# Patient Record
Sex: MALE | Race: BLACK OR AFRICAN AMERICAN | NOT HISPANIC OR LATINO | URBAN - METROPOLITAN AREA
[De-identification: names, ages, dates, MRNs, and addresses within clinical notes are randomized per-mention and may not be internally consistent; named-entity substitution may affect disease eponyms.]

---

## 2020-04-03 ENCOUNTER — EMERGENCY (EMERGENCY)
Facility: HOSPITAL | Age: 30
LOS: 1 days | Discharge: HOME | End: 2020-04-03
Attending: EMERGENCY MEDICINE | Admitting: EMERGENCY MEDICINE
Payer: COMMERCIAL

## 2020-04-03 VITALS
OXYGEN SATURATION: 100 % | DIASTOLIC BLOOD PRESSURE: 109 MMHG | HEART RATE: 75 BPM | RESPIRATION RATE: 18 BRPM | HEIGHT: 69 IN | WEIGHT: 162.92 LBS | TEMPERATURE: 98 F | SYSTOLIC BLOOD PRESSURE: 162 MMHG

## 2020-04-03 VITALS
OXYGEN SATURATION: 100 % | SYSTOLIC BLOOD PRESSURE: 130 MMHG | HEART RATE: 78 BPM | DIASTOLIC BLOOD PRESSURE: 81 MMHG | TEMPERATURE: 98 F | RESPIRATION RATE: 15 BRPM

## 2020-04-03 DIAGNOSIS — R07.89 OTHER CHEST PAIN: ICD-10-CM

## 2020-04-03 PROCEDURE — 93010 ELECTROCARDIOGRAM REPORT: CPT

## 2020-04-03 PROCEDURE — 99284 EMERGENCY DEPT VISIT MOD MDM: CPT

## 2020-04-03 PROCEDURE — 71045 X-RAY EXAM CHEST 1 VIEW: CPT | Mod: 26

## 2020-04-03 PROCEDURE — 93005 ELECTROCARDIOGRAM TRACING: CPT

## 2020-04-03 PROCEDURE — 71045 X-RAY EXAM CHEST 1 VIEW: CPT

## 2020-04-03 PROCEDURE — 99283 EMERGENCY DEPT VISIT LOW MDM: CPT | Mod: 25

## 2020-04-03 NOTE — ED PROVIDER NOTE - CARDIAC, MLM
Normal rate, regular rhythm.  Heart sounds S1, S2.  No murmurs, rubs or gallops. nontender chest wall.no lesions. 2+ rad pulses

## 2020-04-03 NOTE — ED PROVIDER NOTE - PATIENT PORTAL LINK FT
You can access the FollowMyHealth Patient Portal offered by [facility] by registering at the following website: [email or web address]. By joining [facility]’s FollowMyHealth portal, you will also be able to view your health information using other applications (apps) compatible with our system.

## 2020-04-03 NOTE — ED PROVIDER NOTE - OBJECTIVE STATEMENT
pt c/o mid chest pain, sharp, mild, today, assoc c slight cough for 3 days which he attributes to his h/o seasonal allergy.  no rhinorrhea, sore throat, sob. no leg pain/swelling /recent periods of immobility. no fever/chills. no illegal drug use

## 2020-04-03 NOTE — ED ADULT NURSE NOTE - NSIMPLEMENTINTERV_GEN_ALL_ED
Implemented All Universal Safety Interventions:  [place] to call system. Call bell, personal items and telephone within reach. Instruct patient to call for assistance. Room bathroom lighting operational. Non-slip footwear when patient is off stretcher. Physically safe environment: no spills, clutter or unnecessary equipment. Stretcher in lowest position, wheels locked, appropriate side rails in place.

## 2020-04-03 NOTE — ED PROVIDER NOTE - NSFOLLOWUPINSTRUCTIONS_ED_ALL_ED_FT
Follow Up in 1-3 Days with your own doctor or with  [address] [address]  Phone: [phone]      Chest Pain    WHAT YOU NEED TO KNOW:    Chest pain can be caused by a range of conditions, from not serious to life-threatening. Chest pain can be a symptom of a digestive problem, such as acid reflux or a stomach ulcer. An anxiety attack or a strong emotion, such as anger, can also cause chest pain. Infection, inflammation, or a fracture in the bones or cartilage in your chest can cause pain or discomfort. Sometimes chest pain or pressure is caused by poor blood flow to your heart (angina). Chest pain may also be caused by life-threatening conditions such as a heart attack or blood clot in your lungs.     DISCHARGE INSTRUCTIONS:    Call 911 if:   You have any of the following signs of a heart attack:   Squeezing, pressure, or pain in your chest      You may also have any of the following:   Discomfort or pain in your back, neck, jaw, stomach, or arm    Shortness of breath    Nausea or vomiting    Lightheadedness or a sudden cold sweat    Return to the emergency department if:     You have chest discomfort that gets worse, even with medicine.    You cough or vomit blood.     Your bowel movements are black or bloody.     You cannot stop vomiting, or it hurts to swallow.     Contact your healthcare provider if:     You have questions or concerns about your condition or care.        Medicines:     Medicines may be given to treat the cause of your chest pain. Examples include pain medicine, anxiety medicine, or medicines to increase blood flow to your heart.       Do not take certain medicines without asking your healthcare provider first. These include NSAIDs, herbal or vitamin supplements, or hormones (estrogen or progestin).       Take your medicine as directed. Contact your healthcare provider if you think your medicine is not helping or if you have side effects. Tell him or her if you are allergic to any medicine. Keep a list of the medicines, vitamins, and herbs you take. Include the amounts, and when and why you take them. Bring the list or the pill bottles to follow-up visits. Carry your medicine list with you in case of an emergency.    Follow up with your healthcare provider within 72 hours, or as directed: You may need to return for more tests to find the cause of your chest pain. You may be referred to a specialist, such as a cardiologist or gastroenterologist. Write down your questions so you remember to ask them during your visits.     Healthy living tips: The following are general healthy guidelines. If your chest pain is caused by a heart problem, your healthcare provider will give you specific guidelines to follow.    Do not smoke. Nicotine and other chemicals in cigarettes and cigars can cause lung and heart damage. Ask your healthcare provider for information if you currently smoke and need help to quit. E-cigarettes or smokeless tobacco still contain nicotine. Talk to your healthcare provider before you use these products.       Eat a variety of healthy, low-fat, low-salt foods. Healthy foods include fruits, vegetables, whole-grain breads, low-fat dairy products, beans, lean meats, and fish. Ask for more information about a heart healthy diet.    Drink plenty of water every day. Your body is made of mostly water. Water helps your body to control your temperature and blood pressure. Ask your healthcare provider how much water you should drink every day.    Ask about activity. Your healthcare provider will tell you which activities to limit or avoid. Ask when you can drive, return to work, and have sex. Ask about the best exercise plan for you.    Maintain a healthy weight. Ask your healthcare provider how much you should weigh. Ask him or her to help you create a weight loss plan if you are overweight.     Get the flu and pneumonia vaccines. All adults should get the influenza (flu) vaccine. Get it every year as soon as it becomes available. The pneumococcal vaccine is given to adults aged 65 years or older. The vaccine is given every 5 years to prevent pneumococcal disease, such as pneumonia.    If you have a stent:   Carry your stent card with you at all times.   Let all healthcare providers know that you have a stent.

## 2020-04-03 NOTE — ED PROVIDER NOTE - CLINICAL SUMMARY MEDICAL DECISION MAKING FREE TEXT BOX
nonspecific chest pain today in healthy young pt. ekg, cxr done, unremarkable. no signs of acs, pna. no RF for pe.   BP initially high, improved. pt well appearing, stable for dc.  ?gerd, allergies, mild viral syndrome.  pmd fu

## 2020-04-07 PROBLEM — Z78.9 OTHER SPECIFIED HEALTH STATUS: Chronic | Status: ACTIVE | Noted: 2020-04-04

## 2020-04-07 PROBLEM — Z00.00 ENCOUNTER FOR PREVENTIVE HEALTH EXAMINATION: Status: ACTIVE | Noted: 2020-04-07

## 2020-04-08 ENCOUNTER — OUTPATIENT (OUTPATIENT)
Dept: OUTPATIENT SERVICES | Facility: HOSPITAL | Age: 30
LOS: 1 days | End: 2020-04-08
Payer: SELF-PAY

## 2020-04-08 ENCOUNTER — APPOINTMENT (OUTPATIENT)
Dept: FAMILY MEDICINE | Facility: HOSPITAL | Age: 30
End: 2020-04-08

## 2020-04-08 VITALS
SYSTOLIC BLOOD PRESSURE: 154 MMHG | WEIGHT: 156 LBS | DIASTOLIC BLOOD PRESSURE: 96 MMHG | TEMPERATURE: 97.6 F | HEIGHT: 68 IN | OXYGEN SATURATION: 98 % | HEART RATE: 60 BPM | BODY MASS INDEX: 23.64 KG/M2 | RESPIRATION RATE: 14 BRPM

## 2020-04-08 DIAGNOSIS — Z78.9 OTHER SPECIFIED HEALTH STATUS: ICD-10-CM

## 2020-04-08 DIAGNOSIS — Z00.00 ENCOUNTER FOR GENERAL ADULT MEDICAL EXAMINATION WITHOUT ABNORMAL FINDINGS: ICD-10-CM

## 2020-04-08 DIAGNOSIS — F17.200 NICOTINE DEPENDENCE, UNSPECIFIED, UNCOMPLICATED: ICD-10-CM

## 2020-04-08 DIAGNOSIS — Z82.49 FAMILY HISTORY OF ISCHEMIC HEART DISEASE AND OTHER DISEASES OF THE CIRCULATORY SYSTEM: ICD-10-CM

## 2020-04-08 PROCEDURE — G0463: CPT

## 2020-04-08 RX ORDER — OMEPRAZOLE 40 MG/1
40 CAPSULE, DELAYED RELEASE ORAL DAILY
Qty: 30 | Refills: 0 | Status: DISCONTINUED | OUTPATIENT
Start: 2020-04-08 | End: 2020-04-08

## 2020-04-08 NOTE — HISTORY OF PRESENT ILLNESS
[Post-hospitalization from ___ Hospital] : Post-hospitalization from [unfilled] Hospital [Admitted on: ___] : The patient was admitted on [unfilled] [Discharged on ___] : discharged on [unfilled] [Discharge Summary] : discharge summary [Pertinent Labs] : pertinent labs [Radiology Findings] : radiology findings [Discharge Med List] : discharge medication list [Other: ____] : [unfilled] [Med Reconciliation] : medication reconciliation has been completed [FreeTextEntry2] : Patient is a healthy 30 y/o M who present's to the clinic after being discharged from the ED on 4/4/20 for chest pain. Patient recalls that he began having dull, 5/10, substernal, radiated to the left subcostal region, nonpositional pain friday night for one hour before admitting himself to the ED. Patient reports that his pain was associated with palpitations after administering 2 pumps of his rescue inhaler. Patient recollects that he had similar episodes for the past two weeks with fast food 45 minutes after eating. Patient reports that he has never had this chest pain with exertion. He endorses a acidic taste in his mouth from time to time in the mornings.

## 2020-04-08 NOTE — END OF VISIT
[] : Resident [FreeTextEntry3] : I doubt that pt's chest pain is of cardiac origine the way that he described for resident that occur after having fast food and it is regurgitating to her throat, we treat for GERD and if continue to have the same symptoms will need cardiology referral. needs treatment for HTN, will start on amlodipine

## 2020-04-08 NOTE — REVIEW OF SYSTEMS
[Chest Pain] : chest pain [Negative] : Heme/Lymph [Palpitations] : no palpitations [Claudication] : no  leg claudication [Orthopena] : no orthopnea [Shortness Of Breath] : no shortness of breath [Wheezing] : no wheezing [Cough] : no cough [FreeTextEntry5] : 2/10 currently

## 2020-04-08 NOTE — ASSESSMENT
[FreeTextEntry1] : \par \par # Chest Pain secondary to GERD\par -PPI trial of 4 weeks. Patient was told to start a diary for chest pain triggers and avoid fatty foods\par -Cardiology Consult Heart score 0 in the ED. Possible stress test\par \par #HTN\par -Patient elevated BP in ED and clinic 150/90\par -Patient wants to try lifestyle modifications( DASH diet)\par -Called patient and told him to start Norvasc 5mg QD and that he can pick it up in pharmacy \par -Ordered CBC, CMP, and Lipid Panel\par \par Return as needed or in 3 months \par

## 2020-04-10 DIAGNOSIS — R07.9 CHEST PAIN, UNSPECIFIED: ICD-10-CM

## 2020-04-10 DIAGNOSIS — K21.9 GASTRO-ESOPHAGEAL REFLUX DISEASE WITHOUT ESOPHAGITIS: ICD-10-CM

## 2020-04-10 DIAGNOSIS — I10 ESSENTIAL (PRIMARY) HYPERTENSION: ICD-10-CM

## 2020-05-05 ENCOUNTER — TRANSCRIPTION ENCOUNTER (OUTPATIENT)
Age: 30
End: 2020-05-05

## 2021-12-07 ENCOUNTER — TRANSCRIPTION ENCOUNTER (OUTPATIENT)
Age: 31
End: 2021-12-07

## 2022-04-20 ENCOUNTER — TRANSCRIPTION ENCOUNTER (OUTPATIENT)
Age: 32
End: 2022-04-20

## 2022-04-23 ENCOUNTER — TRANSCRIPTION ENCOUNTER (OUTPATIENT)
Age: 32
End: 2022-04-23

## 2022-04-24 ENCOUNTER — EMERGENCY (EMERGENCY)
Facility: HOSPITAL | Age: 32
LOS: 1 days | Discharge: HOME | End: 2022-04-24
Attending: EMERGENCY MEDICINE | Admitting: EMERGENCY MEDICINE
Payer: SELF-PAY

## 2022-04-24 ENCOUNTER — TRANSCRIPTION ENCOUNTER (OUTPATIENT)
Age: 32
End: 2022-04-24

## 2022-04-24 VITALS
TEMPERATURE: 98 F | RESPIRATION RATE: 18 BRPM | SYSTOLIC BLOOD PRESSURE: 155 MMHG | DIASTOLIC BLOOD PRESSURE: 99 MMHG | HEIGHT: 69 IN | WEIGHT: 179.9 LBS | OXYGEN SATURATION: 99 % | HEART RATE: 62 BPM

## 2022-04-24 VITALS
HEART RATE: 55 BPM | OXYGEN SATURATION: 99 % | DIASTOLIC BLOOD PRESSURE: 93 MMHG | RESPIRATION RATE: 17 BRPM | SYSTOLIC BLOOD PRESSURE: 151 MMHG | TEMPERATURE: 98 F

## 2022-04-24 LAB
ALBUMIN SERPL ELPH-MCNC: 3.8 G/DL — SIGNIFICANT CHANGE UP (ref 3.3–5)
ALP SERPL-CCNC: 69 U/L — SIGNIFICANT CHANGE UP (ref 40–120)
ALT FLD-CCNC: 271 U/L — HIGH (ref 10–45)
ANION GAP SERPL CALC-SCNC: 5 MMOL/L — SIGNIFICANT CHANGE UP (ref 5–17)
APPEARANCE UR: CLEAR — SIGNIFICANT CHANGE UP
AST SERPL-CCNC: 1101 U/L — HIGH (ref 10–40)
BACTERIA # UR AUTO: NEGATIVE /HPF — SIGNIFICANT CHANGE UP
BASOPHILS # BLD AUTO: 0.02 K/UL — SIGNIFICANT CHANGE UP (ref 0–0.2)
BASOPHILS NFR BLD AUTO: 0.3 % — SIGNIFICANT CHANGE UP (ref 0–2)
BILIRUB SERPL-MCNC: 0.4 MG/DL — SIGNIFICANT CHANGE UP (ref 0.2–1.2)
BILIRUB UR-MCNC: NEGATIVE — SIGNIFICANT CHANGE UP
BUN SERPL-MCNC: 10 MG/DL — SIGNIFICANT CHANGE UP (ref 7–23)
CALCIUM SERPL-MCNC: 9 MG/DL — SIGNIFICANT CHANGE UP (ref 8.4–10.5)
CHLORIDE SERPL-SCNC: 103 MMOL/L — SIGNIFICANT CHANGE UP (ref 96–108)
CK SERPL-CCNC: 5766 U/L — HIGH (ref 30–200)
CO2 SERPL-SCNC: 32 MMOL/L — HIGH (ref 22–31)
COLOR SPEC: YELLOW — SIGNIFICANT CHANGE UP
CREAT SERPL-MCNC: 1.12 MG/DL — SIGNIFICANT CHANGE UP (ref 0.5–1.3)
DIFF PNL FLD: ABNORMAL
EGFR: 91 ML/MIN/1.73M2 — SIGNIFICANT CHANGE UP
EOSINOPHIL # BLD AUTO: 0.19 K/UL — SIGNIFICANT CHANGE UP (ref 0–0.5)
EOSINOPHIL NFR BLD AUTO: 3.2 % — SIGNIFICANT CHANGE UP (ref 0–6)
EPI CELLS # UR: SIGNIFICANT CHANGE UP
GLUCOSE SERPL-MCNC: 140 MG/DL — HIGH (ref 70–99)
GLUCOSE UR QL: NEGATIVE — SIGNIFICANT CHANGE UP
HCT VFR BLD CALC: 40.7 % — SIGNIFICANT CHANGE UP (ref 39–50)
HGB BLD-MCNC: 13.2 G/DL — SIGNIFICANT CHANGE UP (ref 13–17)
IMM GRANULOCYTES NFR BLD AUTO: 0.3 % — SIGNIFICANT CHANGE UP (ref 0–1.5)
KETONES UR-MCNC: ABNORMAL
LEUKOCYTE ESTERASE UR-ACNC: NEGATIVE — SIGNIFICANT CHANGE UP
LYMPHOCYTES # BLD AUTO: 1.97 K/UL — SIGNIFICANT CHANGE UP (ref 1–3.3)
LYMPHOCYTES # BLD AUTO: 32.9 % — SIGNIFICANT CHANGE UP (ref 13–44)
MCHC RBC-ENTMCNC: 27.3 PG — SIGNIFICANT CHANGE UP (ref 27–34)
MCHC RBC-ENTMCNC: 32.4 GM/DL — SIGNIFICANT CHANGE UP (ref 32–36)
MCV RBC AUTO: 84.1 FL — SIGNIFICANT CHANGE UP (ref 80–100)
MONOCYTES # BLD AUTO: 0.46 K/UL — SIGNIFICANT CHANGE UP (ref 0–0.9)
MONOCYTES NFR BLD AUTO: 7.7 % — SIGNIFICANT CHANGE UP (ref 2–14)
NEUTROPHILS # BLD AUTO: 3.32 K/UL — SIGNIFICANT CHANGE UP (ref 1.8–7.4)
NEUTROPHILS NFR BLD AUTO: 55.6 % — SIGNIFICANT CHANGE UP (ref 43–77)
NITRITE UR-MCNC: NEGATIVE — SIGNIFICANT CHANGE UP
NRBC # BLD: 0 /100 WBCS — SIGNIFICANT CHANGE UP (ref 0–0)
PH UR: 6 — SIGNIFICANT CHANGE UP (ref 5–8)
PLATELET # BLD AUTO: 202 K/UL — SIGNIFICANT CHANGE UP (ref 150–400)
POTASSIUM SERPL-MCNC: 3 MMOL/L — LOW (ref 3.5–5.3)
POTASSIUM SERPL-SCNC: 3 MMOL/L — LOW (ref 3.5–5.3)
PROT SERPL-MCNC: 8 G/DL — SIGNIFICANT CHANGE UP (ref 6–8.3)
PROT UR-MCNC: 15
RBC # BLD: 4.84 M/UL — SIGNIFICANT CHANGE UP (ref 4.2–5.8)
RBC # FLD: 13.5 % — SIGNIFICANT CHANGE UP (ref 10.3–14.5)
RBC CASTS # UR COMP ASSIST: SIGNIFICANT CHANGE UP /HPF (ref 0–4)
SODIUM SERPL-SCNC: 140 MMOL/L — SIGNIFICANT CHANGE UP (ref 135–145)
SP GR SPEC: 1.01 — SIGNIFICANT CHANGE UP (ref 1.01–1.02)
UROBILINOGEN FLD QL: NEGATIVE — SIGNIFICANT CHANGE UP
WBC # BLD: 5.98 K/UL — SIGNIFICANT CHANGE UP (ref 3.8–10.5)
WBC # FLD AUTO: 5.98 K/UL — SIGNIFICANT CHANGE UP (ref 3.8–10.5)
WBC UR QL: SIGNIFICANT CHANGE UP /HPF (ref 0–5)

## 2022-04-24 PROCEDURE — 82550 ASSAY OF CK (CPK): CPT

## 2022-04-24 PROCEDURE — 81001 URINALYSIS AUTO W/SCOPE: CPT

## 2022-04-24 PROCEDURE — 99053 MED SERV 10PM-8AM 24 HR FAC: CPT

## 2022-04-24 PROCEDURE — 85025 COMPLETE CBC W/AUTO DIFF WBC: CPT

## 2022-04-24 PROCEDURE — 36415 COLL VENOUS BLD VENIPUNCTURE: CPT

## 2022-04-24 PROCEDURE — 80053 COMPREHEN METABOLIC PANEL: CPT

## 2022-04-24 PROCEDURE — 99284 EMERGENCY DEPT VISIT MOD MDM: CPT

## 2022-04-24 PROCEDURE — 99283 EMERGENCY DEPT VISIT LOW MDM: CPT

## 2022-04-24 RX ORDER — SODIUM CHLORIDE 9 MG/ML
1000 INJECTION INTRAMUSCULAR; INTRAVENOUS; SUBCUTANEOUS ONCE
Refills: 0 | Status: COMPLETED | OUTPATIENT
Start: 2022-04-24 | End: 2022-04-24

## 2022-04-24 RX ADMIN — SODIUM CHLORIDE 1000 MILLILITER(S): 9 INJECTION INTRAMUSCULAR; INTRAVENOUS; SUBCUTANEOUS at 01:22

## 2022-04-24 RX ADMIN — SODIUM CHLORIDE 1000 MILLILITER(S): 9 INJECTION INTRAMUSCULAR; INTRAVENOUS; SUBCUTANEOUS at 01:56

## 2022-04-24 RX ADMIN — SODIUM CHLORIDE 2000 MILLILITER(S): 9 INJECTION INTRAMUSCULAR; INTRAVENOUS; SUBCUTANEOUS at 00:56

## 2022-04-24 RX ADMIN — SODIUM CHLORIDE 1000 MILLILITER(S): 9 INJECTION INTRAMUSCULAR; INTRAVENOUS; SUBCUTANEOUS at 02:20

## 2022-04-24 NOTE — ED PROVIDER NOTE - OBJECTIVE STATEMENT
30 y/o Male with no sig PMHx presents to Ed c/o severe red colored urine for past 2 days, after working out too much in gym, he was seen in urgent care center and was told he has rhabdo, as had lot of blood and protein in urine, he was advised to drink lot  of liquids, Now he denies any bodyache or muscle pain but c/o right lower groin pain, no N/V

## 2022-04-24 NOTE — ED PROVIDER NOTE - NSFOLLOWUPINSTRUCTIONS_ED_ALL_ED_FT
.      Rhabdomyolysis    WHAT YOU NEED TO KNOW:    What is rhabdomyolysis? Rhabdomyolysis is a condition where injured muscles release harmful substances into the bloodstream. These substances include potassium, phosphate, creatinine kinase, and myoglobin. Large amounts of these substances may damage your kidneys and other organs.     What causes rhabdomyolysis?   •Conditions, such as seizures, severe asthma, and infections. Excessive vomiting or diarrhea, diabetes, or problems of hyperthyroidism (thyroid storm) may also injure your muscles.      •Temperature extremes, such as hyperthermia (very high body temperature) or hypothermia (very low body temperature).      •Extreme muscular activity, such as running marathons, can cause muscle stress and injury.      •Medicines or harmful substances, such as antidepressants or cholesterol medicine may injury your muscles. An overdose of aspirin or diuretics may cause an electrolyte imbalance and muscle injury. Alcohol and illegal drugs such as amphetamines, opiates, ecstasy, and LSD can also cause muscle injury.      •Trauma to the muscles, such as a crushing injury, electrical shock, or severe burns, can cause rhabdomyolysis.      What are the signs and symptoms of rhabdomyolysis?   •Pain, swelling, bruising, or weakness in your legs, arms, or lower back      •Dark-colored urine, blood in the urine, or passing little or no urine at all      •Fast heartbeat      •Confusion or easy irritation      •Nausea and vomiting      •Trouble breathing      How is rhabdomyolysis diagnosed?   •Blood and urine tests may show damage to your kidneys and liver. These tests may also show which substances are released by your injured muscles.       •A CT or MRI may show the muscle injuries or changes. You may be given contrast liquid to help the muscles show up better in the pictures. Tell the healthcare provider if you have ever had an allergic reaction to contrast liquid. Do not enter the MRI room with anything metal. Metal can cause serious injury. Tell the healthcare provider if you have any metal in or on your body.      •A biopsy from your muscle may show which substances are being released. This can help healthcare providers plan your treatment.      How is rhabdomyolysis treated?   •Large amounts of IV fluid help flush substances through your kidneys. Medicines may be added to the fluid to help flush out harmful substances and get rid of extra fluid. Medicines may also help reduce the acidity of your urine.      •Dialysis cleans your blood when your kidneys cannot. Extra water, chemicals, and waste products are removed from your blood by a dialyzer or dialysis machine. The dialysis machine does this by passing your blood through a special filter, then returning it back to you.      •A blood transfusion is when you are given whole or parts of blood through an IV. Blood is tested for diseases, such as hepatitis and HIV, to be sure it is safe.       •Fasciotomy is surgery to cut tissues that cover your muscles. This decreases pressure on blood vessels and nerves caused by swelling of the injured muscle.       How can I manage my symptoms?   •Drink liquids as directed. Ask how much liquid to drink each day and which liquids are best for you. Drink more liquids if you are doing strenuous work, exercise, and if it is warm outside. Liquids help flush substances from your body.      •Do not drink alcohol. Heavy alcohol use may increase your risk for rhabdomyolysis.      When should I contact my healthcare provider?   •You have questions or concerns about your condition or care.          When should I seek immediate care or call 911?   •Your urine is dark or tea-colored or has blood in it.      •You have pain, swelling, or weakness in your arms or legs that does not go away or gets worse.      •You are urinating less than usual or not able to urinate.      •You have chest pain.      •Your heart is beating faster than usual or has a strange rhythm.      CARE AGREEMENT:    You have the right to help plan your care. Learn about your health condition and how it may be treated. Discuss treatment options with your healthcare providers to decide what care you want to receive. You always have the right to refuse treatment.        © Copyright [employer] 2022           back to top                          © Copyright [employer] 2022

## 2022-04-24 NOTE — ED PROVIDER NOTE - PATIENT PORTAL LINK FT
You can access the FollowMyHealth Patient Portal offered by [facility] [place] by registering at the following website: [email or web address]. By joining [facility]’s FollowMyHealth portal, you will also be able to view your health information using other applications (apps) compatible with our system.

## 2022-04-24 NOTE — ED ADULT NURSE NOTE - CHIEF COMPLAINT QUOTE
c/o hematuria x4 days. Pt. went to urgent care on Wednesday received urinalaysis which he reports confirms hematuria and +protein found. Pt. states he was "diagnosed with rhabdomyolysis." Pt. went back to follow-up at urgent care today for another urinalaysis and blood work. Results pending. Pt. now c/o right sided flank pain. Pain 5/10.

## 2022-04-24 NOTE — ED PROVIDER NOTE - CLINICAL SUMMARY MEDICAL DECISION MAKING FREE TEXT BOX
pt p/w red color urine after working out 2 days ago, was hydrating himself as he was already seen in an urgent care center , his urine had mod blood and cpk was only 5700, pt was explained this, he was advised to stay hydrated, and avoid exercise for one week

## 2022-08-02 ENCOUNTER — NON-APPOINTMENT (OUTPATIENT)
Age: 32
End: 2022-08-02

## 2022-08-10 ENCOUNTER — NON-APPOINTMENT (OUTPATIENT)
Age: 32
End: 2022-08-10

## 2022-08-10 ENCOUNTER — APPOINTMENT (OUTPATIENT)
Dept: ORTHOPEDIC SURGERY | Facility: CLINIC | Age: 32
End: 2022-08-10

## 2022-08-10 VITALS
HEIGHT: 69 IN | WEIGHT: 175 LBS | DIASTOLIC BLOOD PRESSURE: 78 MMHG | SYSTOLIC BLOOD PRESSURE: 137 MMHG | HEART RATE: 55 BPM | BODY MASS INDEX: 25.92 KG/M2

## 2022-08-10 PROCEDURE — 99203 OFFICE O/P NEW LOW 30 MIN: CPT

## 2022-08-10 PROCEDURE — 73110 X-RAY EXAM OF WRIST: CPT | Mod: LT

## 2022-08-10 NOTE — DISCUSSION/SUMMARY
[FreeTextEntry1] : He has findings consistent with resolving left ulnar-sided wrist pain either secondary to ECU tendinitis or possibly inflammation at the ulnocarpal joint/TFCC ligament.\par \par I had a discussion with the patient regarding today's visit, the prognosis of this diagnosis, and treatment recommendations and options. At this time, he was instructed to wear a wrist brace as needed and to avoid activities such as pushups. He will follow up as needed, according to his symptoms.  If his symptoms persist, then I may further image him in the future with an MRI.\par \par He has agreed to the above plan of management and has expressed full understanding.  All questions were fully answered to their satisfaction. \par \par My cumulative time spent on this visit included: Preparation for the visit, review of the medical records, review of pertinent diagnostic studies, examination and counseling of the patient on the above diagnosis, treatment plan and prognosis, orders of diagnostic tests, medication and/or appropriate procedures and documentation in the medical records of today's visit.

## 2022-08-10 NOTE — ADDENDUM
[FreeTextEntry1] : I, Winnie Prescott, acted solely as a scribe for Dr. Hoyt on this date on 08/10/2022.

## 2022-08-10 NOTE — END OF VISIT
[FreeTextEntry3] : This note was written by Winnie Prescott on 08/10/2022 acting solely as a scribe for Dr. Laureano Hoyt.\par  \par All medical record entries made by the Scribe were at my, Dr. Laureano Hoyt, direction and personally dictated by me on 08/10/2022. I have personally reviewed the chart and agree that the record accurately reflects my personal performance of the history, physical exam, assessment and plan.

## 2022-08-10 NOTE — HISTORY OF PRESENT ILLNESS
[Right] : right hand dominant [FreeTextEntry1] : He comes in today for evaluation of left wrist pain and clicking since 1 month ago. He denies injury. He states he recently got new tattoos on the left forearm and is unsure if this contributed to his pain as he had positioned the wrist oddly to avoid leaning on the arm. Working out also exacerbates is pain. He notes associated swelling but denies numbness and tingling. His pain is mostly ulnarly at the wrist and to the little finger. He has popping and clicking in the wrist which is positional and most notably occurs with rotational movements. He rates his pain as a 2 out of 10 at this time.

## 2022-08-10 NOTE — PHYSICAL EXAM
[de-identified] : - Constitutional: This is a male in no obvious distress.  \par - Psych: Patient is alert and oriented to person, place and time.  Patient has a normal mood and affect.\par - Cardiovascular: Normal pulses throughout the upper extremities.  No significant varicosities are noted in the upper extremities. \par - Neuro: Strength and sensation are intact throughout the upper extremities.  Patient has normal coordination.\par - Respiratory:  Patient exhibits no evidence of shortness of breath or difficulty breathing.\par - Skin: No rashes, lesions, or other abnormalities are noted in the upper extremities.\par \par --- \par \par Examination of his left wrist demonstrates no obvious swelling.  There is no obvious tenderness along the ECU tendon or TFCC ligament.  He states that he was previously swollen and tender in this region.  There is mild ulnar-sided clicking, but no obvious instability of the ECU tendon.  He has full flexion and extension of the wrist and digits.  He is neurovascularly intact distally. [de-identified] : PA, lateral and PA  radiographs of his left wrist demonstrate mild positive ulnar variance which increases with the  view.

## 2022-09-24 ENCOUNTER — NON-APPOINTMENT (OUTPATIENT)
Age: 32
End: 2022-09-24

## 2022-09-26 ENCOUNTER — OUTPATIENT (OUTPATIENT)
Dept: OUTPATIENT SERVICES | Facility: HOSPITAL | Age: 32
LOS: 1 days | End: 2022-09-26
Payer: SELF-PAY

## 2022-09-26 ENCOUNTER — APPOINTMENT (OUTPATIENT)
Dept: FAMILY MEDICINE | Facility: HOSPITAL | Age: 32
End: 2022-09-26

## 2022-09-26 VITALS
BODY MASS INDEX: 26.36 KG/M2 | WEIGHT: 178 LBS | OXYGEN SATURATION: 96 % | HEART RATE: 90 BPM | SYSTOLIC BLOOD PRESSURE: 145 MMHG | DIASTOLIC BLOOD PRESSURE: 89 MMHG | TEMPERATURE: 98.5 F | RESPIRATION RATE: 14 BRPM | HEIGHT: 69 IN

## 2022-09-26 DIAGNOSIS — Z00.00 ENCOUNTER FOR GENERAL ADULT MEDICAL EXAMINATION WITHOUT ABNORMAL FINDINGS: ICD-10-CM

## 2022-09-26 PROCEDURE — 86780 TREPONEMA PALLIDUM: CPT

## 2022-09-26 PROCEDURE — 36415 COLL VENOUS BLD VENIPUNCTURE: CPT

## 2022-09-26 PROCEDURE — 87389 HIV-1 AG W/HIV-1&-2 AB AG IA: CPT

## 2022-09-26 PROCEDURE — 86695 HERPES SIMPLEX TYPE 1 TEST: CPT

## 2022-09-26 PROCEDURE — G0463: CPT

## 2022-09-26 PROCEDURE — 86803 HEPATITIS C AB TEST: CPT

## 2022-09-26 NOTE — PHYSICAL EXAM
[No Carotid Bruits] : no carotid bruits [No Varicosities] : no varicosities [Pedal Pulses Present] : the pedal pulses are present [No Palpable Aorta] : no palpable aorta [No Extremity Clubbing/Cyanosis] : no extremity clubbing/cyanosis [Normal] : soft, non-tender, non-distended, no masses palpated, no HSM and normal bowel sounds [No Rash] : no rash [Normal Gait] : normal gait [Normal Affect] : the affect was normal

## 2022-09-27 DIAGNOSIS — N34.2 OTHER URETHRITIS: ICD-10-CM

## 2022-09-27 DIAGNOSIS — Z11.3 ENCOUNTER FOR SCREENING FOR INFECTIONS WITH A PREDOMINANTLY SEXUAL MODE OF TRANSMISSION: ICD-10-CM

## 2022-09-27 DIAGNOSIS — R30.0 DYSURIA: ICD-10-CM

## 2022-09-28 ENCOUNTER — NON-APPOINTMENT (OUTPATIENT)
Age: 32
End: 2022-09-28

## 2022-09-28 LAB
C TRACH RRNA SPEC QL NAA+PROBE: DETECTED
HCV AB SER QL: NONREACTIVE
HCV S/CO RATIO: 0.08 S/CO
HIV1+2 AB SPEC QL IA.RAPID: NONREACTIVE
HSV 1+2 IGG SER IA-IMP: NEGATIVE
HSV 1+2 IGG SER IA-IMP: NEGATIVE
HSV1 IGG SER QL: 0.05 INDEX
HSV2 IGG SER QL: 0.08 INDEX
N GONORRHOEA RRNA SPEC QL NAA+PROBE: NOT DETECTED
SOURCE AMPLIFICATION: NORMAL
T PALLIDUM AB SER QL IA: NEGATIVE

## 2022-09-29 NOTE — REVIEW OF SYSTEMS
[Dysuria] : dysuria [Negative] : Respiratory [Incontinence] : no incontinence [Hematuria] : no hematuria [Frequency] : no frequency [Poor Libido] : libido not poor

## 2022-09-29 NOTE — HISTORY OF PRESENT ILLNESS
[FreeTextEntry8] : Patient is a 31 year old male f/u from [facility] UC visit yesterday for 3x days of dysuria and purulent urethral discharge. Patient originally was seen by telehealth and prescribed 3x days of Bactrim which he is set to complete today. In UC patient received 1x IM IV Rocephin 500mg and a 7 day course of PO Doxy 100mg BID x7 days which he is set to begin tomorrow. At present, patient c/o of occasional dysuria but denies further discharge. Patient recently became sexually active with a new partner 1-2 weeks ago. Denies any symptoms in his partner.

## 2022-10-14 ENCOUNTER — OUTPATIENT (OUTPATIENT)
Dept: OUTPATIENT SERVICES | Facility: HOSPITAL | Age: 32
LOS: 1 days | End: 2022-10-14
Payer: SELF-PAY

## 2022-10-14 ENCOUNTER — LABORATORY RESULT (OUTPATIENT)
Age: 32
End: 2022-10-14

## 2022-10-14 ENCOUNTER — APPOINTMENT (OUTPATIENT)
Dept: FAMILY MEDICINE | Facility: HOSPITAL | Age: 32
End: 2022-10-14

## 2022-10-14 VITALS
SYSTOLIC BLOOD PRESSURE: 145 MMHG | BODY MASS INDEX: 25.55 KG/M2 | RESPIRATION RATE: 14 BRPM | OXYGEN SATURATION: 99 % | DIASTOLIC BLOOD PRESSURE: 93 MMHG | WEIGHT: 173 LBS | TEMPERATURE: 98.3 F | HEART RATE: 65 BPM

## 2022-10-14 DIAGNOSIS — Z87.448 PERSONAL HISTORY OF OTHER DISEASES OF URINARY SYSTEM: ICD-10-CM

## 2022-10-14 DIAGNOSIS — Z87.898 PERSONAL HISTORY OF OTHER SPECIFIED CONDITIONS: ICD-10-CM

## 2022-10-14 DIAGNOSIS — Z11.3 ENCOUNTER FOR SCREENING FOR INFECTIONS WITH A PREDOMINANTLY SEXUAL MODE OF TRANSMISSION: ICD-10-CM

## 2022-10-14 DIAGNOSIS — Z00.00 ENCOUNTER FOR GENERAL ADULT MEDICAL EXAMINATION WITHOUT ABNORMAL FINDINGS: ICD-10-CM

## 2022-10-14 PROCEDURE — 85025 COMPLETE CBC W/AUTO DIFF WBC: CPT

## 2022-10-14 PROCEDURE — 82306 VITAMIN D 25 HYDROXY: CPT

## 2022-10-14 PROCEDURE — 80061 LIPID PANEL: CPT

## 2022-10-14 PROCEDURE — G0463: CPT

## 2022-10-14 PROCEDURE — 80053 COMPREHEN METABOLIC PANEL: CPT

## 2022-10-14 NOTE — PHYSICAL EXAM
[No Abdominal Bruit] : a ~M bruit was not heard ~T in the abdomen [No Varicosities] : no varicosities [Pedal Pulses Present] : the pedal pulses are present [No Edema] : there was no peripheral edema [No Extremity Clubbing/Cyanosis] : no extremity clubbing/cyanosis [Normal] : no CVA or spinal tenderness [Grossly Normal Strength/Tone] : grossly normal strength/tone [Normal Gait] : normal gait [Normal Affect] : the affect was normal

## 2022-10-15 LAB
25(OH)D3 SERPL-MCNC: 15.1 NG/ML
ALBUMIN SERPL ELPH-MCNC: 4.6 G/DL
ALP BLD-CCNC: 68 U/L
ALT SERPL-CCNC: 21 U/L
ANION GAP SERPL CALC-SCNC: 11 MMOL/L
AST SERPL-CCNC: 16 U/L
BASOPHILS # BLD AUTO: 0.03 K/UL
BASOPHILS NFR BLD AUTO: 0.9 %
BILIRUB SERPL-MCNC: 0.3 MG/DL
BUN SERPL-MCNC: 10 MG/DL
CALCIUM SERPL-MCNC: 9.4 MG/DL
CHLORIDE SERPL-SCNC: 106 MMOL/L
CHOLEST SERPL-MCNC: 131 MG/DL
CO2 SERPL-SCNC: 25 MMOL/L
CREAT SERPL-MCNC: 1.04 MG/DL
EGFR: 99 ML/MIN/1.73M2
EOSINOPHIL # BLD AUTO: 0.09 K/UL
EOSINOPHIL NFR BLD AUTO: 2.7 %
GLUCOSE SERPL-MCNC: 89 MG/DL
HCT VFR BLD CALC: 41.1 %
HDLC SERPL-MCNC: 56 MG/DL
HGB BLD-MCNC: 13.1 G/DL
LDLC SERPL CALC-MCNC: 67 MG/DL
LYMPHOCYTES # BLD AUTO: 1.19 K/UL
LYMPHOCYTES NFR BLD AUTO: 33.9 %
MAN DIFF?: NORMAL
MCHC RBC-ENTMCNC: 27.2 PG
MCHC RBC-ENTMCNC: 31.9 GM/DL
MCV RBC AUTO: 85.3 FL
MONOCYTES # BLD AUTO: 0.22 K/UL
MONOCYTES NFR BLD AUTO: 6.2 %
NEUTROPHILS # BLD AUTO: 1.94 K/UL
NEUTROPHILS NFR BLD AUTO: 55.4 %
NONHDLC SERPL-MCNC: 75 MG/DL
PLATELET # BLD AUTO: 250 K/UL
POTASSIUM SERPL-SCNC: 4.1 MMOL/L
PROT SERPL-MCNC: 7.7 G/DL
RBC # BLD: 4.82 M/UL
RBC # FLD: 13.9 %
SODIUM SERPL-SCNC: 142 MMOL/L
TRIGL SERPL-MCNC: 41 MG/DL
WBC # FLD AUTO: 3.51 K/UL

## 2022-11-02 ENCOUNTER — APPOINTMENT (OUTPATIENT)
Dept: FAMILY MEDICINE | Facility: HOSPITAL | Age: 32
End: 2022-11-02

## 2022-11-02 ENCOUNTER — OUTPATIENT (OUTPATIENT)
Dept: OUTPATIENT SERVICES | Facility: HOSPITAL | Age: 32
LOS: 1 days | End: 2022-11-02
Payer: SELF-PAY

## 2022-11-02 VITALS
WEIGHT: 175 LBS | BODY MASS INDEX: 25.84 KG/M2 | OXYGEN SATURATION: 98 % | TEMPERATURE: 98.8 F | HEART RATE: 55 BPM | SYSTOLIC BLOOD PRESSURE: 158 MMHG | RESPIRATION RATE: 16 BRPM | DIASTOLIC BLOOD PRESSURE: 88 MMHG

## 2022-11-02 DIAGNOSIS — M25.532 PAIN IN LEFT WRIST: ICD-10-CM
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
## 2022-11-02 DIAGNOSIS — T78.40XA ALLERGY, UNSPECIFIED, INITIAL ENCOUNTER: ICD-10-CM

## 2022-11-02 DIAGNOSIS — Z87.19 PERSONAL HISTORY OF OTHER DISEASES OF THE DIGESTIVE SYSTEM: ICD-10-CM

## 2022-11-02 DIAGNOSIS — Z00.00 ENCOUNTER FOR GENERAL ADULT MEDICAL EXAMINATION WITHOUT ABNORMAL FINDINGS: ICD-10-CM
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
## 2022-11-02 DIAGNOSIS — I10 ESSENTIAL (PRIMARY) HYPERTENSION: ICD-10-CM

## 2022-11-02 DIAGNOSIS — M77.8 OTHER ENTHESOPATHIES, NOT ELSEWHERE CLASSIFIED: ICD-10-CM

## 2022-11-02 PROCEDURE — G0463: CPT

## 2022-11-02 RX ORDER — OMEPRAZOLE 40 MG/1
40 CAPSULE, DELAYED RELEASE ORAL
Qty: 30 | Refills: 0 | Status: COMPLETED | COMMUNITY
Start: 2020-04-08 | End: 2022-11-02

## 2022-11-02 RX ORDER — DOXYCYCLINE 100 MG/1
100 CAPSULE ORAL
Qty: 14 | Refills: 0 | Status: COMPLETED | COMMUNITY
Start: 2022-09-28 | End: 2022-11-02

## 2022-11-02 RX ORDER — AMLODIPINE BESYLATE 5 MG/1
5 TABLET ORAL DAILY
Qty: 30 | Refills: 3 | Status: COMPLETED | COMMUNITY
Start: 2020-04-08 | End: 2022-11-02

## 2022-11-02 NOTE — REVIEW OF SYSTEMS
[Negative] : Neurological [Hematuria] : no hematuria [Joint Pain] : no joint pain [Back Pain] : no back pain [Dysuria] : no dysuria [Headache] : no headache [Dizziness] : no dizziness [FreeTextEntry8] : +morning discharge

## 2022-11-02 NOTE — HISTORY OF PRESENT ILLNESS
[de-identified] : Patient is a 31 year old M with a PMhx of Chlamydia (s/p treated) here for f/u blood work. Patient is in NAD at this time however he endorses morning discharge. Patient states his partner was also treated for chlamydia and repeat testing was negative for her. Patient denies burning on urination, fevers, chills, nausea, vomiting. Patient is currently sexually active with the same partner and states they are in a monogamous relationship. \par \par Patient's vitals are significant for repeat elevated BPs. Patient states he eats junk food on a daily basis, smokes marijuana 3-4x/weekly and has "a few beers" once a week.

## 2022-11-02 NOTE — PHYSICAL EXAM
[Pedal Pulses Present] : the pedal pulses are present [No Edema] : there was no peripheral edema [Normal] : soft, non-tender, non-distended, no masses palpated, no HSM and normal bowel sounds [No CVA Tenderness] : no CVA  tenderness [Grossly Normal Strength/Tone] : grossly normal strength/tone [Normal Gait] : normal gait [Normal Affect] : the affect was normal

## 2022-11-02 NOTE — HISTORY OF PRESENT ILLNESS
[de-identified] : Patient is a 31 year old M with a PMhx of Chlamydia (s/p treated) here for f/u blood work. Patient is in NAD at this time however he endorses morning discharge. Patient states his partner was also treated for chlamydia and repeat testing was negative for her. Patient denies burning on urination, fevers, chills, nausea, vomiting. Patient is currently sexually active with the same partner and states they are in a monogamous relationship. \par \par Patient's vitals are significant for repeat elevated BPs. Patient states he eats junk food on a daily basis, smokes marijuana 3-4x/weekly and has "a few beers" once a week.

## 2022-11-05 DIAGNOSIS — T78.40XA ALLERGY, UNSPECIFIED, INITIAL ENCOUNTER: ICD-10-CM

## 2022-11-05 DIAGNOSIS — A74.9 CHLAMYDIAL INFECTION, UNSPECIFIED: ICD-10-CM

## 2022-11-05 DIAGNOSIS — R03.0 ELEVATED BLOOD-PRESSURE READING, WITHOUT DIAGNOSIS OF HYPERTENSION: ICD-10-CM

## 2022-11-07 LAB
C TRACH RRNA SPEC QL NAA+PROBE: NOT DETECTED
N GONORRHOEA RRNA SPEC QL NAA+PROBE: NOT DETECTED
SOURCE AMPLIFICATION: NORMAL

## 2022-11-09 ENCOUNTER — OUTPATIENT (OUTPATIENT)
Dept: OUTPATIENT SERVICES | Facility: HOSPITAL | Age: 32
LOS: 1 days | End: 2022-11-09
Payer: SELF-PAY

## 2022-11-09 ENCOUNTER — APPOINTMENT (OUTPATIENT)
Dept: FAMILY MEDICINE | Facility: HOSPITAL | Age: 32
End: 2022-11-09

## 2022-11-09 ENCOUNTER — RESULT CHARGE (OUTPATIENT)
Age: 32
End: 2022-11-09

## 2022-11-09 VITALS
OXYGEN SATURATION: 100 % | WEIGHT: 176 LBS | HEART RATE: 95 BPM | RESPIRATION RATE: 17 BRPM | BODY MASS INDEX: 25.99 KG/M2 | TEMPERATURE: 98 F | SYSTOLIC BLOOD PRESSURE: 144 MMHG | DIASTOLIC BLOOD PRESSURE: 102 MMHG

## 2022-11-09 DIAGNOSIS — Z00.00 ENCOUNTER FOR GENERAL ADULT MEDICAL EXAMINATION WITHOUT ABNORMAL FINDINGS: ICD-10-CM

## 2022-11-09 DIAGNOSIS — R36.9 URETHRAL DISCHARGE, UNSPECIFIED: ICD-10-CM

## 2022-11-09 PROCEDURE — G0463: CPT

## 2022-11-09 PROCEDURE — 87661 TRICHOMONAS VAGINALIS AMPLIF: CPT

## 2022-11-09 NOTE — PHYSICAL EXAM
[Normal] : soft, non-tender, non-distended, no masses palpated, no HSM and normal bowel sounds [No CVA Tenderness] : no CVA  tenderness [Normal Gait] : normal gait [Normal Affect] : the affect was normal

## 2022-11-10 NOTE — REVIEW OF SYSTEMS
[Dysuria] : dysuria [Negative] : Gastrointestinal [Incontinence] : no incontinence [Hematuria] : no hematuria [Frequency] : no frequency [Joint Pain] : no joint pain [Back Pain] : no back pain [Skin Rash] : no skin rash [Headache] : no headache [Dizziness] : no dizziness [FreeTextEntry8] : +penile discharge

## 2022-11-11 LAB
BILIRUB UR QL STRIP: NORMAL
C TRACH RRNA SPEC QL NAA+PROBE: NOT DETECTED
CLARITY UR: CLEAR
COLLECTION METHOD: NORMAL
GLUCOSE UR-MCNC: NORMAL
HCG UR QL: 0.2 EU/DL
HGB UR QL STRIP.AUTO: NORMAL
KETONES UR-MCNC: NORMAL
LEUKOCYTE ESTERASE UR QL STRIP: NORMAL
N GONORRHOEA RRNA SPEC QL NAA+PROBE: NOT DETECTED
NITRITE UR QL STRIP: NORMAL
PH UR STRIP: 6
PROT UR STRIP-MCNC: NORMAL
SOURCE AMPLIFICATION: NORMAL
SP GR UR STRIP: >=1.03

## 2022-11-21 LAB
SOURCE AMPLIFICATION: NORMAL
T VAGINALIS RRNA SPEC QL NAA+PROBE: NOT DETECTED

## 2022-11-22 ENCOUNTER — APPOINTMENT (OUTPATIENT)
Dept: FAMILY MEDICINE | Facility: HOSPITAL | Age: 32
End: 2022-11-22

## 2022-12-22 NOTE — HISTORY OF PRESENT ILLNESS
[FreeTextEntry1] : CPE  [de-identified] : Patient is a 31 year old M with PMHx of HTN and Chlamydia (s/p treatment) presenting today for CPE. Patient is in NAD at this time and has no acute complaints at this time. Patient is s/p IM Rocephin and 7 day course of Doxycycline in September 2022 for Chlamydia. Patient's partner also tested positive and is currently undergoing the same treatment regimen. Patient has a FMx of Rheumatoid arthritis in his father as HLD and T2DM and is concerned regarding his own health. Patient requesting routine labs at this time.

## 2022-12-22 NOTE — HEALTH RISK ASSESSMENT
[Very Good] : ~his/her~  mood as very good [Never] : Never [Yes] : Yes [2 - 4 times a month (2 pts)] : 2-4 times a month (2 points) [1 or 2 (0 pts)] : 1 or 2 (0 points) [Never (0 pts)] : Never (0 points) [No falls in past year] : Patient reported no falls in the past year [0] : 2) Feeling down, depressed, or hopeless: Not at all (0) [PHQ-2 Negative - No further assessment needed] : PHQ-2 Negative - No further assessment needed [None] : None [Alone] : lives alone [Employed] : employed [College] : College [Significant Other] : lives with significant other [Sexually Active] : sexually active [Feels Safe at Home] : Feels safe at home [Fully functional (bathing, dressing, toileting, transferring, walking, feeding)] : Fully functional (bathing, dressing, toileting, transferring, walking, feeding) [Fully functional (using the telephone, shopping, preparing meals, housekeeping, doing laundry, using] : Fully functional and needs no help or supervision to perform IADLs (using the telephone, shopping, preparing meals, housekeeping, doing laundry, using transportation, managing medications and managing finances) [Reports normal functional visual acuity (ie: able to read med bottle)] : Reports normal functional visual acuity [de-identified] : Daily jogging, calisthenics  [de-identified] : "fair, normal" [Change in mental status noted] : No change in mental status noted [High Risk Behavior] : no high risk behavior [Reports changes in hearing] : Reports no changes in hearing [Reports changes in vision] : Reports no changes in vision [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

## 2023-03-02 ENCOUNTER — APPOINTMENT (OUTPATIENT)
Dept: FAMILY MEDICINE | Facility: HOSPITAL | Age: 33
End: 2023-03-02

## 2023-03-02 ENCOUNTER — OUTPATIENT (OUTPATIENT)
Dept: OUTPATIENT SERVICES | Facility: HOSPITAL | Age: 33
LOS: 1 days | End: 2023-03-02
Payer: SELF-PAY

## 2023-03-02 ENCOUNTER — RESULT CHARGE (OUTPATIENT)
Age: 33
End: 2023-03-02

## 2023-03-02 VITALS
HEART RATE: 95 BPM | DIASTOLIC BLOOD PRESSURE: 85 MMHG | BODY MASS INDEX: 26.29 KG/M2 | TEMPERATURE: 102.8 F | SYSTOLIC BLOOD PRESSURE: 144 MMHG | RESPIRATION RATE: 17 BRPM | WEIGHT: 178 LBS | OXYGEN SATURATION: 98 %

## 2023-03-02 VITALS — TEMPERATURE: 101.8 F

## 2023-03-02 DIAGNOSIS — Z87.09 PERSONAL HISTORY OF OTHER DISEASES OF THE RESPIRATORY SYSTEM: ICD-10-CM

## 2023-03-02 DIAGNOSIS — Z20.822 CONTACT WITH AND (SUSPECTED) EXPOSURE TO COVID-19: ICD-10-CM

## 2023-03-02 DIAGNOSIS — Z00.00 ENCOUNTER FOR GENERAL ADULT MEDICAL EXAMINATION W/OUT ABNORMAL FINDINGS: ICD-10-CM

## 2023-03-02 DIAGNOSIS — J03.00 ACUTE STREPTOCOCCAL TONSILLITIS, UNSPECIFIED: ICD-10-CM

## 2023-03-02 DIAGNOSIS — A74.9 CHLAMYDIAL INFECTION, UNSPECIFIED: ICD-10-CM

## 2023-03-02 DIAGNOSIS — R50.9 FEVER, UNSPECIFIED: ICD-10-CM

## 2023-03-02 DIAGNOSIS — Z00.00 ENCOUNTER FOR GENERAL ADULT MEDICAL EXAMINATION WITHOUT ABNORMAL FINDINGS: ICD-10-CM

## 2023-03-02 DIAGNOSIS — Z23 ENCOUNTER FOR IMMUNIZATION: ICD-10-CM

## 2023-03-02 PROCEDURE — 87635 SARS-COV-2 COVID-19 AMP PRB: CPT

## 2023-03-02 PROCEDURE — G0463: CPT

## 2023-03-02 RX ORDER — ACETAMINOPHEN 325 MG/1
325 TABLET ORAL
Qty: 0 | Refills: 0 | Status: COMPLETED | OUTPATIENT
Start: 2023-03-02

## 2023-03-02 RX ORDER — METRONIDAZOLE 500 MG/1
500 TABLET ORAL
Qty: 4 | Refills: 0 | Status: DISCONTINUED | COMMUNITY
Start: 2022-11-09 | End: 2023-03-02

## 2023-03-02 RX ORDER — AZITHROMYCIN 500 MG/1
500 TABLET, FILM COATED ORAL
Qty: 5 | Refills: 0 | Status: DISCONTINUED | COMMUNITY
Start: 2022-11-09 | End: 2023-03-02

## 2023-03-02 RX ADMIN — ACETAMINOPHEN 2 MG: 325 TABLET ORAL at 00:00

## 2023-03-02 NOTE — REVIEW OF SYSTEMS
[Fever] : fever [Chills] : chills [Fatigue] : fatigue [Hot Flashes] : no hot flashes [Night Sweats] : night sweats [Recent Change In Weight] : ~T no recent weight change [Earache] : no earache [Hearing Loss] : no hearing loss [Nosebleeds] : no nosebleeds [Postnasal Drip] : no postnasal drip [Nasal Discharge] : no nasal discharge [Sore Throat] : sore throat [Hoarseness] : no hoarseness [Negative] : Integumentary

## 2023-03-02 NOTE — ASSESSMENT
[FreeTextEntry1] : Above discussed w Dr. Stafford\par \par RTC in 1 month for BP monitoring w Dr. John

## 2023-03-02 NOTE — PHYSICAL EXAM
[Normal] : soft, non-tender, non-distended, no masses palpated, no HSM and normal bowel sounds [de-identified] : White exudate noted on left tonsil. B/L cervical LN palpated.

## 2023-03-02 NOTE — HISTORY OF PRESENT ILLNESS
[FreeTextEntry8] : 31 yo M presenting acutely for evaluation of fever. \par -Pt states that yesterday he woke up w a mild sore throat, progressed through his day as usual. Then states as of last night at 8 pm started experiencing myalgias, fatigue, subjective fever, night sweats along w chills. Took some Advil which helped the fever. This morning started experiencing once again fevers and fatigue along w sore throat. He also noticed some abnormality on his tonsil and wanted further evaluation, so presented to clinic. COVID was negative. Denies recent sick contacts or recent travel. Admits to having strep throat as a child was recently around children 1 week ago. \par -BP noted to be elevated, also noted to be elevated at last visit. Denies chest pain, SOB or LEMOS. \par

## 2023-03-03 DIAGNOSIS — R50.9 FEVER, UNSPECIFIED: ICD-10-CM

## 2023-03-03 DIAGNOSIS — Z11.52 ENCOUNTER FOR SCREENING FOR COVID-19: ICD-10-CM

## 2023-03-03 DIAGNOSIS — R03.0 ELEVATED BLOOD-PRESSURE READING, WITHOUT DIAGNOSIS OF HYPERTENSION: ICD-10-CM

## 2023-04-03 ENCOUNTER — APPOINTMENT (OUTPATIENT)
Dept: FAMILY MEDICINE | Facility: HOSPITAL | Age: 33
End: 2023-04-03

## 2023-04-03 DIAGNOSIS — R03.0 ELEVATED BLOOD-PRESSURE READING, W/OUT DIAGNOSIS OF HYPERTENSION: ICD-10-CM

## 2023-11-29 ENCOUNTER — EMERGENCY (EMERGENCY)
Facility: HOSPITAL | Age: 33
LOS: 1 days | Discharge: HOME | End: 2023-11-29
Attending: EMERGENCY MEDICINE | Admitting: EMERGENCY MEDICINE
Payer: SELF-PAY

## 2023-11-29 PROCEDURE — 99053 MED SERV 10PM-8AM 24 HR FAC: CPT

## 2023-11-29 PROCEDURE — 99284 EMERGENCY DEPT VISIT MOD MDM: CPT

## 2023-11-30 ENCOUNTER — NON-APPOINTMENT (OUTPATIENT)
Age: 33
End: 2023-11-30

## 2023-11-30 VITALS
WEIGHT: 175.05 LBS | OXYGEN SATURATION: 97 % | DIASTOLIC BLOOD PRESSURE: 94 MMHG | TEMPERATURE: 97 F | HEART RATE: 91 BPM | RESPIRATION RATE: 18 BRPM | SYSTOLIC BLOOD PRESSURE: 131 MMHG | HEIGHT: 69 IN

## 2023-11-30 VITALS
HEART RATE: 87 BPM | SYSTOLIC BLOOD PRESSURE: 130 MMHG | RESPIRATION RATE: 18 BRPM | OXYGEN SATURATION: 97 % | DIASTOLIC BLOOD PRESSURE: 89 MMHG

## 2023-11-30 PROCEDURE — 71045 X-RAY EXAM CHEST 1 VIEW: CPT | Mod: 26

## 2023-11-30 PROCEDURE — 99283 EMERGENCY DEPT VISIT LOW MDM: CPT | Mod: 25

## 2023-11-30 PROCEDURE — 93010 ELECTROCARDIOGRAM REPORT: CPT

## 2023-11-30 PROCEDURE — 94640 AIRWAY INHALATION TREATMENT: CPT

## 2023-11-30 PROCEDURE — 71045 X-RAY EXAM CHEST 1 VIEW: CPT

## 2023-11-30 PROCEDURE — 93005 ELECTROCARDIOGRAM TRACING: CPT

## 2023-11-30 RX ORDER — IBUPROFEN 200 MG
600 TABLET ORAL ONCE
Refills: 0 | Status: COMPLETED | OUTPATIENT
Start: 2023-11-30 | End: 2023-11-30

## 2023-11-30 RX ORDER — ALBUTEROL 90 UG/1
2 AEROSOL, METERED ORAL ONCE
Refills: 0 | Status: COMPLETED | OUTPATIENT
Start: 2023-11-30 | End: 2023-11-30

## 2023-11-30 RX ADMIN — ALBUTEROL 2 PUFF(S): 90 AEROSOL, METERED ORAL at 01:01

## 2023-11-30 RX ADMIN — Medication 600 MILLIGRAM(S): at 01:01

## 2023-11-30 NOTE — ED PROVIDER NOTE - RESPIRATORY [+], MLM
COUGH/SHORTNESS OF BREATH [Follow-Up Evaluation] : a follow-up evaluation for [Developmental Delay] : developmental delay [Father] : father

## 2023-11-30 NOTE — ED PROVIDER NOTE - NSFOLLOWUPINSTRUCTIONS_ED_ALL_ED_FT
Continue Tylenol and or Motrin as needed for fever or pain  -Use albuterol inhaler, 2 puffs every 6 hours as needed for difficult breathing or wheezing  -Take over-the-counter cough medications as directed as needed    Follow Up in 1-3 Days with your own doctor or with  [facility]  [address] [place] [address]  Phone: [phone]      Acute Bronchitis    WHAT YOU NEED TO KNOW:    Acute bronchitis is swelling and irritation in your lungs. It is usually caused by a virus and most often happens in the winter. Bronchitis may also be caused by bacteria or by a chemical irritant, such as smoke.    DISCHARGE INSTRUCTIONS:    Return to the emergency department if:   •You cough up blood.      •Your lips or fingernails turn blue.      •You feel like you are not getting enough air when you breathe.      Call your doctor if:   •Your symptoms do not go away or get worse, even after treatment.      •Your cough does not get better within 4 weeks.      •You have questions or concerns about your condition or care.      Medicines: You may need any of the following:   •Cough suppressants decrease your urge to cough.       •Decongestants help loosen mucus in your lungs and make it easier to cough up. This can help you breathe easier.      •Inhalers may be given. Your healthcare provider may give you one or more inhalers to help you breathe easier and cough less. An inhaler gives your medicine to open your airways. Ask your healthcare provider to show you how to use your inhaler correctly.  Metered Dose Inhaler           •Acetaminophen decreases pain and fever. It is available without a doctor's order. Ask how much to take and how often to take it. Follow directions. Read the labels of all other medicines you are using to see if they also contain acetaminophen, or ask your doctor or pharmacist. Acetaminophen can cause liver damage if not taken correctly. Do not use more than 4 grams (4,000 milligrams) total of acetaminophen in one day.       •NSAIDs help decrease swelling and pain or fever. This medicine is available with or without a doctor's order. NSAIDs can cause stomach bleeding or kidney problems in certain people. If you take blood thinner medicine, always ask your healthcare provider if NSAIDs are safe for you. Always read the medicine label and follow directions.      •Take your medicine as directed. Contact your healthcare provider if you think your medicine is not helping or if you have side effects. Tell him of her if you are allergic to any medicine. Keep a list of the medicines, vitamins, and herbs you take. Include the amounts, and when and why you take them. Bring the list or the pill bottles to follow-up visits. Carry your medicine list with you in case of an emergency.      Self-care:   •Drink liquids as directed. You may need to drink more liquids than usual to stay hydrated. Ask how much liquid to drink each day and which liquids are best for you.      •Use a cool mist humidifier to increase air moisture in your home. This may make it easier for you to breathe and help decrease your cough.       •Get more rest. Rest helps your body to heal. Slowly start to do more each day. Rest when you feel it is needed.      •Avoid irritants in the air. Avoid chemicals, fumes, and dust. Wear a face mask if you must work around dust or fumes. Stay inside on days when air pollution levels are high. If you have allergies, stay inside when pollen counts are high. Do not use aerosol products, such as spray-on deodorant, bug spray, and hair spray.      •Do not smoke or be around others who are smoking. Nicotine and other chemicals in cigarettes and cigars can cause lung damage. Ask your healthcare provider for information if you currently smoke and need help to quit. E-cigarettes or smokeless tobacco still contain nicotine. Talk to your healthcare provider before you use these products.       Prevent acute bronchitis:          •Get the vaccinations you need. Ask your healthcare provider if you should get the flu or pneumonia vaccines.      •Prevent the spread of germs. You can decrease your risk for acute bronchitis and other illnesses by doing the following: ?Wash your hands often with soap and water. Carry germ-killing hand lotion or gel with you. You can use the lotion or gel to clean your hands when soap and water are not available.  Handwashing           ?Do not touch your eyes, nose, or mouth unless you have washed your hands first.      ?Always cover your mouth when you cough to prevent the spread of germs. It is best to cough into a tissue or your shirt sleeve instead of into your hand. Ask those around you to cover their mouths when they cough.      ?Try to avoid people who have a cold or the flu. If you are sick, stay away from others as much as possible.        Follow up with your doctor as directed: Write down questions you have so you will remember to ask them during your follow-up visits.

## 2023-11-30 NOTE — ED PROVIDER NOTE - CLINICAL SUMMARY MEDICAL DECISION MAKING FREE TEXT BOX
33-year-old male with no significant medical history complaining of coughing for the last 5 days associated with nasal congestion and sinus pressure, green mucus discharge, fever, max 100 degrees, with sweats.  Also feels some chest pains with coughing and mild shortness of breath with lying down.  No rash.  No leg swelling.  No travel.  Also states after coughing for a few days he noted small amount of blood with the phlegm that he coughs up    Patient well-appearing in no distress.  Vitals unremarkable.  No hypoxia.  Lungs clear.  Most likely viral URI.  Will check x-ray rule out pneumonia.  Check RVP.  Chest pain most likely musculoskeletal, due to coughing.  Will check EKG to screen for pericarditis/myocarditis.  Will trial albuterol inhaler.  Motrin for pain.  Reassess 33-year-old male with no significant medical history complaining of coughing for the last 5 days associated with nasal congestion and sinus pressure, green mucus discharge, fever, max 100 degrees, with sweats.  Also feels some chest pains with coughing and mild shortness of breath with lying down.  No rash.  No leg swelling.  No travel.  Also states after coughing for a few days he noted small amount of blood with the phlegm that he coughs up    Patient well-appearing in no distress.  Vitals unremarkable.  No hypoxia.  Lungs clear.  Most likely viral URI.  Will check x-ray rule out pneumonia.  Check RVP.  Chest pain most likely musculoskeletal, due to coughing.  Will check EKG to screen for pericarditis/myocarditis.  Will trial albuterol inhaler.  Motrin for pain.  Reassess    Update: EKG and chest x-ray unremarkable.  Vital stable.  Continue supportive care, OTC medications.  Albuterol as needed.  Follow-up PMD

## 2023-11-30 NOTE — ED PROVIDER NOTE - PHYSICAL EXAMINATION
exam:   General: well appearing, NAD.   HEENT: eyes perrl, nose mild congestion, OP no erythema/exudate/swelling.   cor: RRR, s1s2, 2+rad pulses.   lungs: ctabl, no resp distress.   abd: soft, ntnd.   neuro: a&ox3, cn2-12 intact, PADRON, 5/5 strength c nl sensation all extremities, nl coordination.   MSK: no extremity swelling.  Skin: normal, no rash

## 2023-11-30 NOTE — ED PROVIDER NOTE - PATIENT PORTAL LINK FT
You can access the FollowMyHealth Patient Portal offered by [facility] by registering at the following website: [email or web address]. By joining [facility]’s FollowMyHealth portal, you will also be able to view your health information using other applications (apps) compatible with our system.

## 2023-11-30 NOTE — ED PROVIDER NOTE - OBJECTIVE STATEMENT
33-year-old male with no significant medical history complaining of coughing for the last 5 days associated with nasal congestion and sinus pressure, green mucus discharge, fever, max 100 degrees, with sweats.  Also feels some chest pains with coughing and mild shortness of breath with lying down.  No rash.  No leg swelling.  No travel.  Also states after coughing for a few days he noted small amount of blood with the phlegm that he coughs up

## 2023-11-30 NOTE — ED ADULT TRIAGE NOTE - CHIEF COMPLAINT QUOTE
pt axo3. c/o cough, congestion, and fevers x3 days. pt states that he is coughing up a lot of mucus and that when he lays down he starts wheezing. pt last took extra strength tylenol x1 hour ago. per pt he had a sinus infection x1 week ago.

## 2024-06-01 ENCOUNTER — APPOINTMENT (OUTPATIENT)
Dept: FAMILY MEDICINE | Facility: HOSPITAL | Age: 34
End: 2024-06-01

## 2024-06-01 ENCOUNTER — OUTPATIENT (OUTPATIENT)
Dept: OUTPATIENT SERVICES | Facility: HOSPITAL | Age: 34
LOS: 1 days | End: 2024-06-01
Payer: SELF-PAY

## 2024-06-01 VITALS
OXYGEN SATURATION: 98 % | BODY MASS INDEX: 28.44 KG/M2 | RESPIRATION RATE: 16 BRPM | SYSTOLIC BLOOD PRESSURE: 140 MMHG | DIASTOLIC BLOOD PRESSURE: 80 MMHG | HEIGHT: 69 IN | HEART RATE: 63 BPM | WEIGHT: 192 LBS | TEMPERATURE: 97.9 F

## 2024-06-01 DIAGNOSIS — Z72.51 HIGH RISK HETEROSEXUAL BEHAVIOR: ICD-10-CM

## 2024-06-01 DIAGNOSIS — R36.9 URETHRAL DISCHARGE, UNSPECIFIED: ICD-10-CM

## 2024-06-01 DIAGNOSIS — Z00.00 ENCOUNTER FOR GENERAL ADULT MEDICAL EXAMINATION WITHOUT ABNORMAL FINDINGS: ICD-10-CM

## 2024-06-01 PROCEDURE — 87591 N.GONORRHOEAE DNA AMP PROB: CPT

## 2024-06-01 PROCEDURE — 87389 HIV-1 AG W/HIV-1&-2 AB AG IA: CPT

## 2024-06-01 PROCEDURE — G0463: CPT

## 2024-06-01 PROCEDURE — 87491 CHLMYD TRACH DNA AMP PROBE: CPT

## 2024-06-01 PROCEDURE — 86780 TREPONEMA PALLIDUM: CPT

## 2024-06-01 PROCEDURE — 86803 HEPATITIS C AB TEST: CPT

## 2024-06-01 RX ORDER — CEFTRIAXONE 500 MG/1
500 INJECTION, POWDER, FOR SOLUTION INTRAMUSCULAR; INTRAVENOUS
Qty: 1 | Refills: 0 | Status: ACTIVE | COMMUNITY
Start: 2024-06-01 | End: 1900-01-01

## 2024-06-01 RX ORDER — AMOXICILLIN 500 MG/1
500 CAPSULE ORAL TWICE DAILY
Qty: 20 | Refills: 0 | Status: COMPLETED | COMMUNITY
Start: 2023-03-02 | End: 2024-06-01

## 2024-06-01 RX ORDER — DOXYCYCLINE HYCLATE 100 MG/1
100 CAPSULE ORAL TWICE DAILY
Qty: 14 | Refills: 0 | Status: ACTIVE | COMMUNITY
Start: 2024-06-01 | End: 1900-01-01

## 2024-06-01 NOTE — PHYSICAL EXAM
[Normal] : no acute distress, well nourished, well developed and well-appearing [Urethral Meatus] : meatus normal [Scrotum] : the scrotum was normal [Testes Tenderness] : no tenderness of the testes [Testes Mass (___cm)] : there were no testicular masses

## 2024-06-04 ENCOUNTER — NON-APPOINTMENT (OUTPATIENT)
Age: 34
End: 2024-06-04

## 2024-06-07 ENCOUNTER — LABORATORY RESULT (OUTPATIENT)
Age: 34
End: 2024-06-07

## 2024-06-07 ENCOUNTER — APPOINTMENT (OUTPATIENT)
Dept: FAMILY MEDICINE | Facility: HOSPITAL | Age: 34
End: 2024-06-07

## 2024-06-07 ENCOUNTER — OUTPATIENT (OUTPATIENT)
Dept: OUTPATIENT SERVICES | Facility: HOSPITAL | Age: 34
LOS: 1 days | End: 2024-06-07
Payer: SELF-PAY

## 2024-06-07 VITALS
BODY MASS INDEX: 28.29 KG/M2 | SYSTOLIC BLOOD PRESSURE: 134 MMHG | DIASTOLIC BLOOD PRESSURE: 88 MMHG | TEMPERATURE: 98.1 F | OXYGEN SATURATION: 98 % | WEIGHT: 191 LBS | RESPIRATION RATE: 14 BRPM | HEART RATE: 56 BPM | HEIGHT: 69 IN

## 2024-06-07 DIAGNOSIS — R36.9 URETHRAL DISCHARGE, UNSPECIFIED: ICD-10-CM

## 2024-06-07 DIAGNOSIS — Z00.00 ENCOUNTER FOR GENERAL ADULT MEDICAL EXAMINATION WITHOUT ABNORMAL FINDINGS: ICD-10-CM

## 2024-06-07 LAB
BILIRUB UR QL STRIP: NEGATIVE
CLARITY UR: CLEAR
COLLECTION METHOD: NORMAL
GLUCOSE UR-MCNC: NEGATIVE
HCG UR QL: 0.2 EU/DL
HGB UR QL STRIP.AUTO: NORMAL
KETONES UR-MCNC: NORMAL
LEUKOCYTE ESTERASE UR QL STRIP: NORMAL
NITRITE UR QL STRIP: NEGATIVE
PH UR STRIP: 6
PROT UR STRIP-MCNC: NORMAL
SP GR UR STRIP: 1.03

## 2024-06-07 PROCEDURE — G0463: CPT

## 2024-06-07 PROCEDURE — 87800 DETECT AGNT MULT DNA DIREC: CPT

## 2024-06-07 PROCEDURE — 87591 N.GONORRHOEAE DNA AMP PROB: CPT

## 2024-06-07 PROCEDURE — 87086 URINE CULTURE/COLONY COUNT: CPT

## 2024-06-07 PROCEDURE — 87491 CHLMYD TRACH DNA AMP PROBE: CPT

## 2024-06-07 NOTE — PHYSICAL EXAM
[No Acute Distress] : no acute distress [No Respiratory Distress] : no respiratory distress  [Clear to Auscultation] : lungs were clear to auscultation bilaterally [Normal Rate] : normal rate  [Normal S1, S2] : normal S1 and S2 [Soft] : abdomen soft [Non Tender] : non-tender [Non-distended] : non-distended [No Rash] : no rash [de-identified] : No penile discharge seen. No blisters seen on genitals. No other rashes. Urethral meatus has some crusting. Exam performed with chaperone Pascale negrete [de-identified] : No inguinal lymphadenopathy

## 2024-06-07 NOTE — REVIEW OF SYSTEMS
[Fever] : no fever [Chills] : no chills [Dysuria] : no dysuria [Hesitancy] : no hesitancy [Hematuria] : no hematuria [Frequency] : no frequency [Negative] : Gastrointestinal [FreeTextEntry8] : Whitish, yellowish, greenish penile discharge per patient

## 2024-06-07 NOTE — HISTORY OF PRESENT ILLNESS
[FreeTextEntry1] : Penile discharge [de-identified] : 33 M with no sig PMH presenting for a follow up visit to address penile discharge. Patient was seen last week and was given a dose of IM CTX. Patient was also prescribed Doxy but he has not started the pills yet. HIV, syphilis RPR, and GC-chlamydia was negative at the time. Per patient, he was called and instructed not to take the doxy. Patient has been sexually active with one partner for the last two years. Patient denied dysuria, lower abdominal pain, or flank pain. Denied fevers, chills, blisters, rash on genitals. Reports that the discharge is worse in the morning.

## 2024-06-07 NOTE — PLAN
[FreeTextEntry1] : - BV panel with urethral swab ordered to assess trichomoniasis  - Urine GC/Chlamydia repeated - UA mildly positive with blood and leuks - will send for culture - Advised patient to finish the course of doxycycline and return for a follow up visit in 2 weeks - if trichomonas positive, will need metronidazole treatment.   Case d/w Dr. Escamilla

## 2024-06-10 LAB
BACTERIA UR CULT: NORMAL
HCV AB SER QL: NONREACTIVE
HCV S/CO RATIO: 0.28 S/CO
HIV1+2 AB SPEC QL IA.RAPID: NONREACTIVE
T PALLIDUM AB SER QL IA: NEGATIVE

## 2024-06-11 ENCOUNTER — NON-APPOINTMENT (OUTPATIENT)
Age: 34
End: 2024-06-11

## 2024-06-22 ENCOUNTER — APPOINTMENT (OUTPATIENT)
Dept: FAMILY MEDICINE | Facility: HOSPITAL | Age: 34
End: 2024-06-22

## 2024-06-24 NOTE — HISTORY OF PRESENT ILLNESS
[FreeTextEntry8] : 34 yo M presents with complain of penile discharge that started yesterday, no associated dysuria or testicular pain. Pt has 2 sexual partners, admits to unprotected sexual intercourse 2 weeks ago. Hx of chlamydia 2 yrs ago.